# Patient Record
Sex: MALE | Race: WHITE | NOT HISPANIC OR LATINO | Employment: UNEMPLOYED | URBAN - METROPOLITAN AREA
[De-identification: names, ages, dates, MRNs, and addresses within clinical notes are randomized per-mention and may not be internally consistent; named-entity substitution may affect disease eponyms.]

---

## 2018-10-05 ENCOUNTER — HOSPITAL ENCOUNTER (OUTPATIENT)
Dept: FAMILY MEDICINE CLINIC | Facility: CLINIC | Age: 53
Setting detail: SPECIMEN
Discharge: HOME | End: 2018-10-05
Attending: FAMILY MEDICINE | Admitting: FAMILY MEDICINE

## 2018-10-05 LAB
ALBUMIN SERPL-MCNC: 4.3 G/DL (ref 3.5–4.8)
ALBUMIN/GLOB SERPL: 1.7 {RATIO} (ref 1–1.7)
ALP SERPL-CCNC: 56 IU/L (ref 32–91)
ALT SERPL-CCNC: 25 IU/L (ref 17–63)
ANION GAP SERPL CALC-SCNC: 13.9 MMOL/L (ref 10–20)
AST SERPL-CCNC: 21 IU/L (ref 15–41)
BASOPHILS # BLD AUTO: 0 10*3/UL (ref 0–0.2)
BASOPHILS NFR BLD AUTO: 1 % (ref 0–2)
BILIRUB SERPL-MCNC: 2.1 MG/DL (ref 0.3–1.2)
BUN SERPL-MCNC: 19 MG/DL (ref 8–20)
BUN/CREAT SERPL: 21.1 (ref 6.2–20.3)
CALCIUM SERPL-MCNC: 9.2 MG/DL (ref 8.9–10.3)
CHLORIDE SERPL-SCNC: 103 MMOL/L (ref 101–111)
CHOLEST SERPL-MCNC: 197 MG/DL
CHOLEST/HDLC SERPL: 2.6 {RATIO}
CONV CO2: 23 MMOL/L (ref 22–32)
CONV LDL CHOLESTEROL DIRECT: 112 MG/DL (ref 0–100)
CONV TOTAL PROTEIN: 6.9 G/DL (ref 6.1–7.9)
CREAT UR-MCNC: 0.9 MG/DL (ref 0.7–1.2)
DIFFERENTIAL METHOD BLD: (no result)
EOSINOPHIL # BLD AUTO: 0.1 10*3/UL (ref 0–0.3)
EOSINOPHIL # BLD AUTO: 1 % (ref 0–3)
ERYTHROCYTE [DISTWIDTH] IN BLOOD BY AUTOMATED COUNT: 13.1 % (ref 11.5–14.5)
GLOBULIN UR ELPH-MCNC: 2.6 G/DL (ref 2.5–3.8)
GLUCOSE SERPL-MCNC: 100 MG/DL (ref 65–99)
HCT VFR BLD AUTO: 44.6 % (ref 40–54)
HDLC SERPL-MCNC: 77 MG/DL
HGB BLD-MCNC: 15.7 G/DL (ref 14–18)
LDLC/HDLC SERPL: 1.5 {RATIO}
LIPID INTERPRETATION: ABNORMAL
LYMPHOCYTES # BLD AUTO: 1.4 10*3/UL (ref 0.8–4.8)
LYMPHOCYTES NFR BLD AUTO: 22 % (ref 18–42)
MCH RBC QN AUTO: 31.2 PG (ref 26–32)
MCHC RBC AUTO-ENTMCNC: 35.2 G/DL (ref 32–36)
MCV RBC AUTO: 88.6 FL (ref 80–94)
MONOCYTES # BLD AUTO: 0.8 10*3/UL (ref 0.1–1.3)
MONOCYTES NFR BLD AUTO: 13 % (ref 2–11)
NEUTROPHILS # BLD AUTO: 4.1 10*3/UL (ref 2.3–8.6)
NEUTROPHILS NFR BLD AUTO: 63 % (ref 50–75)
NRBC BLD AUTO-RTO: 0 /100{WBCS}
NRBC/RBC NFR BLD MANUAL: 0 10*3/UL
PLATELET # BLD AUTO: 192 10*3/UL (ref 150–450)
PMV BLD AUTO: 9.5 FL (ref 7.4–10.4)
POTASSIUM SERPL-SCNC: 3.9 MMOL/L (ref 3.6–5.1)
RBC # BLD AUTO: 5.03 10*6/UL (ref 4.6–6)
SODIUM SERPL-SCNC: 136 MMOL/L (ref 136–144)
TRIGL SERPL-MCNC: 45 MG/DL
VLDLC SERPL CALC-MCNC: 8.7 MG/DL
WBC # BLD AUTO: 6.4 10*3/UL (ref 4.5–11.5)

## 2019-05-30 ENCOUNTER — OFFICE (AMBULATORY)
Dept: URBAN - METROPOLITAN AREA PATHOLOGY 4 | Facility: PATHOLOGY | Age: 54
End: 2019-05-30
Payer: COMMERCIAL

## 2019-05-30 ENCOUNTER — ON CAMPUS - OUTPATIENT (AMBULATORY)
Dept: URBAN - METROPOLITAN AREA HOSPITAL 2 | Facility: HOSPITAL | Age: 54
End: 2019-05-30
Payer: COMMERCIAL

## 2019-05-30 VITALS
DIASTOLIC BLOOD PRESSURE: 74 MMHG | DIASTOLIC BLOOD PRESSURE: 75 MMHG | WEIGHT: 161 LBS | SYSTOLIC BLOOD PRESSURE: 140 MMHG | DIASTOLIC BLOOD PRESSURE: 86 MMHG | SYSTOLIC BLOOD PRESSURE: 121 MMHG | HEART RATE: 65 BPM | HEART RATE: 60 BPM | DIASTOLIC BLOOD PRESSURE: 85 MMHG | TEMPERATURE: 98.2 F | HEIGHT: 68 IN | SYSTOLIC BLOOD PRESSURE: 141 MMHG | DIASTOLIC BLOOD PRESSURE: 78 MMHG | OXYGEN SATURATION: 99 % | HEART RATE: 61 BPM | SYSTOLIC BLOOD PRESSURE: 142 MMHG | RESPIRATION RATE: 16 BRPM | OXYGEN SATURATION: 100 % | SYSTOLIC BLOOD PRESSURE: 131 MMHG | HEART RATE: 64 BPM

## 2019-05-30 DIAGNOSIS — K64.1 SECOND DEGREE HEMORRHOIDS: ICD-10-CM

## 2019-05-30 DIAGNOSIS — D12.2 BENIGN NEOPLASM OF ASCENDING COLON: ICD-10-CM

## 2019-05-30 DIAGNOSIS — D12.4 BENIGN NEOPLASM OF DESCENDING COLON: ICD-10-CM

## 2019-05-30 DIAGNOSIS — Z80.0 FAMILY HISTORY OF MALIGNANT NEOPLASM OF DIGESTIVE ORGANS: ICD-10-CM

## 2019-05-30 LAB
GI HISTOLOGY: A. UNSPECIFIED: (no result)
GI HISTOLOGY: B. UNSPECIFIED: (no result)
GI HISTOLOGY: PDF REPORT: (no result)

## 2019-05-30 PROCEDURE — 88305 TISSUE EXAM BY PATHOLOGIST: CPT | Mod: 33 | Performed by: INTERNAL MEDICINE

## 2019-05-30 PROCEDURE — 45380 COLONOSCOPY AND BIOPSY: CPT | Mod: 33 | Performed by: INTERNAL MEDICINE

## 2019-05-31 ENCOUNTER — HOSPITAL ENCOUNTER (OUTPATIENT)
Dept: CARDIOLOGY | Facility: HOSPITAL | Age: 54
Discharge: HOME | End: 2019-05-31
Attending: FAMILY MEDICINE | Admitting: FAMILY MEDICINE

## 2019-08-13 ENCOUNTER — TELEPHONE (OUTPATIENT)
Dept: FAMILY MEDICINE CLINIC | Facility: CLINIC | Age: 54
End: 2019-08-13

## 2019-08-16 ENCOUNTER — TELEPHONE (OUTPATIENT)
Dept: FAMILY MEDICINE CLINIC | Facility: CLINIC | Age: 54
End: 2019-08-16

## 2019-08-16 NOTE — TELEPHONE ENCOUNTER
Patient is wanting a referral to see [facility] for dermatology. Their fax number is [phone]

## 2019-09-30 ENCOUNTER — OFFICE VISIT (OUTPATIENT)
Dept: FAMILY MEDICINE CLINIC | Facility: CLINIC | Age: 54
End: 2019-09-30

## 2019-09-30 VITALS
HEIGHT: 68 IN | BODY MASS INDEX: 25.55 KG/M2 | OXYGEN SATURATION: 98 % | WEIGHT: 168.6 LBS | SYSTOLIC BLOOD PRESSURE: 153 MMHG | RESPIRATION RATE: 16 BRPM | HEART RATE: 98 BPM | DIASTOLIC BLOOD PRESSURE: 81 MMHG | TEMPERATURE: 98.2 F

## 2019-09-30 DIAGNOSIS — L57.0 ACTINIC KERATOSIS: ICD-10-CM

## 2019-09-30 DIAGNOSIS — M62.08 RECTUS DIASTASIS: Primary | ICD-10-CM

## 2019-09-30 PROBLEM — E80.4 GILBERT'S SYNDROME: Status: ACTIVE | Noted: 2018-11-01

## 2019-09-30 PROCEDURE — 99213 OFFICE O/P EST LOW 20 MIN: CPT | Performed by: FAMILY MEDICINE

## 2019-09-30 RX ORDER — FLUTICASONE PROPIONATE 50 MCG
2 SPRAY, SUSPENSION (ML) NASAL 2 TIMES DAILY
COMMUNITY
Start: 2012-08-24 | End: 2021-09-24

## 2019-09-30 RX ORDER — ASPIRIN 81 MG/1
1 TABLET ORAL DAILY
Refills: 3 | COMMUNITY
Start: 2019-07-21 | End: 2020-03-16

## 2019-09-30 RX ORDER — TRAZODONE HYDROCHLORIDE 100 MG/1
1 TABLET ORAL NIGHTLY
Refills: 3 | COMMUNITY
Start: 2019-07-21 | End: 2020-03-16

## 2019-09-30 RX ORDER — LORATADINE 10 MG/1
10 TABLET ORAL DAILY
Refills: 3 | COMMUNITY
Start: 2019-07-21 | End: 2020-03-16

## 2019-09-30 RX ORDER — ZOLPIDEM TARTRATE 10 MG/1
1 TABLET ORAL NIGHTLY
Refills: 1 | COMMUNITY
Start: 2019-09-13 | End: 2019-12-22

## 2019-09-30 RX ORDER — MONTELUKAST SODIUM 10 MG/1
10 TABLET ORAL DAILY
Refills: 3 | COMMUNITY
Start: 2019-07-21 | End: 2020-03-16

## 2019-09-30 NOTE — PROGRESS NOTES
"Subjective   uEgene Roman is a 53 y.o. male.     Chief Complaint   Patient presents with   • Hernia       HPI  Chief Complaint: Abdominal hernia, actinic keratosis    Patient is a 53-year-old white male states that he is noticed a bulge in the midline of the abdominal area.  This is above his bellybutton.  He states that several centimeters lump he states it only occurs when he does sit ups.  Denied pain.  Denies injury.    Patient also notices a skin lesion in the left facial area.  He states it is red.  He states that it does not go away.  Again he denies injury.  Patient is outside a lot and he had significant sun exposure.      The following portions of the patient's history were reviewed and updated as appropriate: allergies, current medications, past family history, past medical history, past social history, past surgical history and problem list.    Review of Systems    Objective     /81 (BP Location: Right arm, Patient Position: Sitting, Cuff Size: Adult)   Pulse 98   Temp 98.2 °F (36.8 °C) (Oral)   Resp 16   Ht 172.7 cm (68\")   Wt 76.5 kg (168 lb 9.6 oz)   SpO2 98%   BMI 25.64 kg/m²     Physical Exam   Constitutional: He is oriented to person, place, and time. He appears well-developed and well-nourished.   HENT:   Head: Normocephalic and atraumatic.   Eyes: Conjunctivae and EOM are normal. Pupils are equal, round, and reactive to light.   Neck: Normal range of motion. Neck supple.   Cardiovascular: Normal rate, regular rhythm, normal heart sounds and intact distal pulses.   Pulmonary/Chest: Effort normal and breath sounds normal.   Abdominal: Soft. Bowel sounds are normal.   Diastasis rectus     Musculoskeletal: Normal range of motion.   Neurological: He is alert and oriented to person, place, and time.   Skin: Skin is warm and dry.   Actinic keratoses and left cheek   Psychiatric: He has a normal mood and affect. His behavior is normal.   Nursing note and vitals reviewed.    He was advised " that he has diastases rectus.  Patient was advised that surgical repair is not necessary unless he desires it for cosmetic reasons more than the other.    Assessment/Plan   Eugene was seen today for hernia.    Diagnoses and all orders for this visit:    Rectus diastasis    Actinic keratosis  -     Ambulatory Referral to Dermatology      Patient Instructions   Continue your current medications and treatment.    Consider seeing a surgeon to discuss repair.    Follow up at your next appointment.          Black Mesa Jr., MD    09/30/19

## 2019-09-30 NOTE — PATIENT INSTRUCTIONS
Continue your current medications and treatment.    Consider seeing a surgeon to discuss repair.    Follow-up with rheumatology.    Follow up at your next appointment.

## 2019-12-22 DIAGNOSIS — F51.01 PRIMARY INSOMNIA: Primary | ICD-10-CM

## 2019-12-22 RX ORDER — ZOLPIDEM TARTRATE 10 MG/1
TABLET ORAL
Qty: 90 TABLET | Refills: 0 | Status: SHIPPED | OUTPATIENT
Start: 2019-12-22 | End: 2020-03-16

## 2020-03-16 DIAGNOSIS — F51.01 PRIMARY INSOMNIA: ICD-10-CM

## 2020-03-16 RX ORDER — TRAZODONE HYDROCHLORIDE 100 MG/1
TABLET ORAL
Qty: 90 TABLET | Refills: 0 | Status: SHIPPED | OUTPATIENT
Start: 2020-03-16 | End: 2020-05-22

## 2020-03-16 RX ORDER — ASPIRIN 81 MG/1
TABLET ORAL
Qty: 90 TABLET | Refills: 0 | Status: SHIPPED | OUTPATIENT
Start: 2020-03-16 | End: 2020-05-22

## 2020-03-16 RX ORDER — LORATADINE 10 MG/1
TABLET ORAL
Qty: 90 TABLET | Refills: 0 | Status: SHIPPED | OUTPATIENT
Start: 2020-03-16 | End: 2020-05-22

## 2020-03-16 RX ORDER — ZOLPIDEM TARTRATE 10 MG/1
TABLET ORAL
Qty: 90 TABLET | Refills: 0 | Status: SHIPPED | OUTPATIENT
Start: 2020-03-16 | End: 2020-06-02 | Stop reason: SDUPTHER

## 2020-03-16 RX ORDER — MONTELUKAST SODIUM 10 MG/1
TABLET ORAL
Qty: 90 TABLET | Refills: 0 | Status: SHIPPED | OUTPATIENT
Start: 2020-03-16 | End: 2020-05-22

## 2020-05-22 RX ORDER — MONTELUKAST SODIUM 10 MG/1
TABLET ORAL
Qty: 90 TABLET | Refills: 0 | Status: SHIPPED | OUTPATIENT
Start: 2020-05-22 | End: 2020-06-02 | Stop reason: SDUPTHER

## 2020-05-22 RX ORDER — ASPIRIN 81 MG/1
TABLET, COATED ORAL
Qty: 90 TABLET | Refills: 0 | Status: SHIPPED | OUTPATIENT
Start: 2020-05-22 | End: 2020-06-02 | Stop reason: SDUPTHER

## 2020-05-22 RX ORDER — LORATADINE 10 MG/1
TABLET ORAL
Qty: 90 TABLET | Refills: 0 | Status: SHIPPED | OUTPATIENT
Start: 2020-05-22 | End: 2020-06-02 | Stop reason: SDUPTHER

## 2020-05-22 RX ORDER — TRAZODONE HYDROCHLORIDE 100 MG/1
TABLET ORAL
Qty: 90 TABLET | Refills: 0 | Status: SHIPPED | OUTPATIENT
Start: 2020-05-22 | End: 2020-06-02 | Stop reason: SDUPTHER

## 2020-06-02 ENCOUNTER — TELEMEDICINE (OUTPATIENT)
Dept: FAMILY MEDICINE CLINIC | Facility: CLINIC | Age: 55
End: 2020-06-02

## 2020-06-02 VITALS — DIASTOLIC BLOOD PRESSURE: 84 MMHG | HEART RATE: 62 BPM | SYSTOLIC BLOOD PRESSURE: 128 MMHG

## 2020-06-02 DIAGNOSIS — J30.9 ALLERGIC RHINITIS, UNSPECIFIED SEASONALITY, UNSPECIFIED TRIGGER: ICD-10-CM

## 2020-06-02 DIAGNOSIS — I38 VALVULAR HEART DISEASE: ICD-10-CM

## 2020-06-02 DIAGNOSIS — E80.4 GILBERT'S SYNDROME: ICD-10-CM

## 2020-06-02 DIAGNOSIS — F51.01 PRIMARY INSOMNIA: ICD-10-CM

## 2020-06-02 DIAGNOSIS — E78.5 DYSLIPIDEMIA: Primary | ICD-10-CM

## 2020-06-02 DIAGNOSIS — Z95.2 PRESENCE OF PROSTHETIC HEART VALVE: ICD-10-CM

## 2020-06-02 PROCEDURE — 99214 OFFICE O/P EST MOD 30 MIN: CPT | Performed by: FAMILY MEDICINE

## 2020-06-02 RX ORDER — ASPIRIN 81 MG/1
81 TABLET ORAL DAILY
Qty: 90 TABLET | Refills: 3 | Status: SHIPPED | OUTPATIENT
Start: 2020-06-02 | End: 2020-06-08

## 2020-06-02 RX ORDER — LORATADINE 10 MG/1
10 TABLET ORAL DAILY
Qty: 90 TABLET | Refills: 3 | Status: SHIPPED | OUTPATIENT
Start: 2020-06-02 | End: 2020-08-22

## 2020-06-02 RX ORDER — TRAZODONE HYDROCHLORIDE 100 MG/1
100 TABLET ORAL
Qty: 90 TABLET | Refills: 3 | Status: SHIPPED | OUTPATIENT
Start: 2020-06-02 | End: 2020-06-08

## 2020-06-02 RX ORDER — MONTELUKAST SODIUM 10 MG/1
10 TABLET ORAL DAILY
Qty: 90 TABLET | Refills: 3 | Status: SHIPPED | OUTPATIENT
Start: 2020-06-02 | End: 2020-06-08

## 2020-06-02 RX ORDER — ZOLPIDEM TARTRATE 10 MG/1
10 TABLET ORAL
Qty: 90 TABLET | Refills: 1 | Status: SHIPPED | OUTPATIENT
Start: 2020-06-02 | End: 2021-01-21 | Stop reason: SDUPTHER

## 2020-06-02 NOTE — PROGRESS NOTES
Subjective   Eugene Roman is a 54 y.o. male.     Chief Complaint   Patient presents with   • Insomnia       HPI  Chief complaint: Insomnia allergic rhinitis heart disease    The patient is a 54-year-old white male who was seen by video visit today for follow-up and maintenance of his current problems.  Patient gave permission to be seen by video visit.    The patient's current problems include    1.  Primary insomnia-stable-patient on trazodone 50 mg at night and Ambien 10 mg at night.  He states this helps with his sleep and anxiousness.    2.  Allergic rhinitis-stable-patient is on Claritin 10 mg daily and Singulair 10 mg daily.  He denied sinus congestion drainage cough shortness of breath or wheezing.    3.  Valvular heart disease-the patient's had aortic valve replacement and pulmonic valve repair with tissue valves.  Patient denies chest pain shortness of breath orthopnea or PND.    4.  Hyperlipidemia-stable-patient is currently on no medications.  He is treating this with lifestyle modification.          The following portions of the patient's history were reviewed and updated as appropriate: allergies, current medications, past family history, past medical history, past social history, past surgical history and problem list.    Review of Systems   Constitutional: Negative for chills and fever.   HENT: Negative for congestion, sinus pressure and swollen glands.    Eyes: Negative for blurred vision and pain.   Respiratory: Negative for cough and shortness of breath.    Cardiovascular: Negative for chest pain and leg swelling.   Gastrointestinal: Negative for abdominal pain, nausea and indigestion.   Endocrine: Negative for cold intolerance, heat intolerance, polydipsia, polyphagia and polyuria.   Skin: Negative for dry skin, rash and bruise.   Neurological: Negative for dizziness, syncope and headache.   Psychiatric/Behavioral: Negative for dysphoric mood and stress.       Objective     /84   Pulse 62      Physical Exam   Constitutional: He appears well-developed and well-nourished.   Pulmonary/Chest: Effort normal.   Musculoskeletal: Normal range of motion.   Vitals reviewed.         Assessment/Plan   Eugene was seen today for insomnia.    Diagnoses and all orders for this visit:    Dyslipidemia  -     Lipid Panel; Future    Presence of prosthetic heart valve  -     CBC & Differential; Future  -     Comprehensive Metabolic Panel; Future    Allergic rhinitis, unspecified seasonality, unspecified trigger    Gilbert's syndrome    Primary insomnia    Valvular heart disease    Other orders  -     loratadine (CLARITIN) 10 MG tablet; Take 1 tablet by mouth Daily.  -     aspirin (Aspirin Low Dose) 81 MG EC tablet; Take 1 tablet by mouth Daily.  -     traZODone (DESYREL) 100 MG tablet; Take 1 tablet by mouth every night at bedtime.  -     montelukast (SINGULAIR) 10 MG tablet; Take 1 tablet by mouth Daily.      Patient Instructions   Continue your current medications and treatment.    Have the follow up labs done and call for results.    Follow up in the office in 6 months.      Black Mesa Jr., MD    06/02/20

## 2020-06-08 RX ORDER — MONTELUKAST SODIUM 10 MG/1
TABLET ORAL
Qty: 90 TABLET | Refills: 3 | Status: SHIPPED | OUTPATIENT
Start: 2020-06-08 | End: 2021-08-19 | Stop reason: SDUPTHER

## 2020-06-08 RX ORDER — TRAZODONE HYDROCHLORIDE 100 MG/1
TABLET ORAL
Qty: 90 TABLET | Refills: 3 | Status: SHIPPED | OUTPATIENT
Start: 2020-06-08 | End: 2021-08-12

## 2020-06-08 RX ORDER — ASPIRIN 81 MG
TABLET, DELAYED RELEASE (ENTERIC COATED) ORAL
Qty: 90 TABLET | Refills: 3 | Status: SHIPPED | OUTPATIENT
Start: 2020-06-08

## 2020-08-22 RX ORDER — LORATADINE 10 MG/1
TABLET ORAL
Qty: 90 TABLET | Refills: 3 | Status: SHIPPED | OUTPATIENT
Start: 2020-08-22

## 2021-01-21 ENCOUNTER — TELEMEDICINE (OUTPATIENT)
Dept: FAMILY MEDICINE CLINIC | Facility: CLINIC | Age: 56
End: 2021-01-21

## 2021-01-21 VITALS — SYSTOLIC BLOOD PRESSURE: 126 MMHG | HEART RATE: 68 BPM | DIASTOLIC BLOOD PRESSURE: 77 MMHG

## 2021-01-21 DIAGNOSIS — E78.5 DYSLIPIDEMIA: Chronic | ICD-10-CM

## 2021-01-21 DIAGNOSIS — F51.01 PRIMARY INSOMNIA: Primary | Chronic | ICD-10-CM

## 2021-01-21 DIAGNOSIS — J30.9 ALLERGIC RHINITIS, UNSPECIFIED SEASONALITY, UNSPECIFIED TRIGGER: Chronic | ICD-10-CM

## 2021-01-21 PROBLEM — I38 VALVULAR HEART DISEASE: Chronic | Status: ACTIVE | Noted: 2020-06-02

## 2021-01-21 PROCEDURE — 99213 OFFICE O/P EST LOW 20 MIN: CPT | Performed by: FAMILY MEDICINE

## 2021-01-21 RX ORDER — TADALAFIL 10 MG/1
10 TABLET ORAL DAILY PRN
Qty: 30 TABLET | Refills: 2 | Status: SHIPPED | OUTPATIENT
Start: 2021-01-21 | End: 2021-08-19 | Stop reason: SDUPTHER

## 2021-01-21 RX ORDER — ZOLPIDEM TARTRATE 10 MG/1
10 TABLET ORAL
Qty: 90 TABLET | Refills: 1 | Status: SHIPPED | OUTPATIENT
Start: 2021-01-21 | End: 2021-07-08 | Stop reason: SDUPTHER

## 2021-01-21 NOTE — PROGRESS NOTES
Subjective   Eugene Roman is a 55 y.o. male.     Chief Complaint   Patient presents with   • Insomnia   • Allergic Rhinitis       HPI  Chief complaint: Insomnia, allergic rhinitis, valvular heart disease    Patient is a 55-year-old white male comes in for follow-up and maintenance of his current problems which include    1.  Insomnia-stable-the patient is current on Ambien 10 mg daily and trazodone 50 to 100 mg at night.  He states that this helps him to sleep.    2.  Allergic rhinitis-stable-patient is on Claritin 10 mg daily as needed Singulair 10 mg daily as needed and Flonase.  He states that he has problems in the spring and fall and when he is outside.  She does not take these regularly.    3.  Valvular heart disease-stable-patient status post aortic valve replacement and pulmonic valve repair.  Denies chest pain shortness of breath orthopnea or PND.        The following portions of the patient's history were reviewed and updated as appropriate: allergies, current medications, past family history, past medical history, past social history, past surgical history and problem list.    Review of Systems    Objective     /77   Pulse 68     Physical Exam   Constitutional: He appears well-developed and well-nourished.   Vitals reviewed.         Assessment/Plan   Diagnoses and all orders for this visit:    1. Primary insomnia (Primary)  -     zolpidem (AMBIEN) 10 MG tablet; Take 1 tablet by mouth every night at bedtime.  Dispense: 90 tablet; Refill: 1    2. Dyslipidemia  -     Lipid Panel; Future  -     Glucose, Plasma ([facility]); Future    3. Allergic rhinitis, unspecified seasonality, unspecified trigger    Other orders  -     tadalafil (Cialis) 10 MG tablet; Take 1 tablet by mouth Daily As Needed for Erectile Dysfunction.  Dispense: 30 tablet; Refill: 2      Patient Instructions   Continue your current healthy lifestyle.    Have the follow up labs done and call for results.    Continue current  medications.    Follow up in the office in 6 months.      Black Mesa Jr., MD    01/21/21

## 2021-07-08 ENCOUNTER — TELEPHONE (OUTPATIENT)
Dept: FAMILY MEDICINE CLINIC | Facility: CLINIC | Age: 56
End: 2021-07-08

## 2021-07-08 DIAGNOSIS — F51.01 PRIMARY INSOMNIA: Chronic | ICD-10-CM

## 2021-07-08 RX ORDER — ZOLPIDEM TARTRATE 10 MG/1
10 TABLET ORAL
Qty: 90 TABLET | Refills: 1 | Status: CANCELLED | OUTPATIENT
Start: 2021-07-08

## 2021-07-08 RX ORDER — ZOLPIDEM TARTRATE 10 MG/1
10 TABLET ORAL
Qty: 90 TABLET | Refills: 0 | Status: SHIPPED | OUTPATIENT
Start: 2021-07-08 | End: 2021-08-19 | Stop reason: SDUPTHER

## 2021-07-08 RX ORDER — ZOLPIDEM TARTRATE 10 MG/1
10 TABLET ORAL
Qty: 90 TABLET | Refills: 0 | Status: SHIPPED | OUTPATIENT
Start: 2021-07-08 | End: 2021-07-08 | Stop reason: SDUPTHER

## 2021-07-08 NOTE — TELEPHONE ENCOUNTER
Caller: Eugene Roman    Relationship: Self    Best call back number: [phone]     Medication needed:   Requested Prescriptions     Pending Prescriptions Disp Refills   • zolpidem (AMBIEN) 10 MG tablet 90 tablet 1     Sig: Take 1 tablet by mouth every night at bedtime.       When do you need the refill by: 07/12/21    What additional details did the patient provide when requesting the medication: HAS AN APPT ON 08/19/21    Does the patient have less than a 3 day supply:  [] Yes  [x] No    What is the patient's preferred pharmacy: [facility] [address] [facility] AT [facility] [phone] [facility] [phone]

## 2021-07-08 NOTE — TELEPHONE ENCOUNTER
PATIENT REQUESTS THE MEDICATION:  zolpidem (AMBIEN) 10 MG tablet [[id number]]     BE SENT TO:  [facility] [address] [place] [address] [address] AT [facility] - [phone]  - [phone] FX  P: [phone]  F: [phone]        PATIENT CALL BACK #: [phone]

## 2021-07-28 ENCOUNTER — LAB (OUTPATIENT)
Dept: LAB | Facility: HOSPITAL | Age: 56
End: 2021-07-28

## 2021-07-28 DIAGNOSIS — E78.5 DYSLIPIDEMIA: Chronic | ICD-10-CM

## 2021-07-28 DIAGNOSIS — E78.5 HYPERLIPIDEMIA, UNSPECIFIED HYPERLIPIDEMIA TYPE: Primary | ICD-10-CM

## 2021-07-28 LAB
CHOLEST SERPL-MCNC: 189 MG/DL (ref 0–200)
GLUCOSE SERPL-MCNC: 93 MG/DL (ref 65–99)
HDLC SERPL-MCNC: 59 MG/DL (ref 40–60)
LDLC SERPL CALC-MCNC: 114 MG/DL (ref 0–100)
LDLC/HDLC SERPL: 1.9 {RATIO}
TRIGL SERPL-MCNC: 90 MG/DL (ref 0–150)
VLDLC SERPL-MCNC: 16 MG/DL (ref 5–40)

## 2021-07-28 PROCEDURE — 80061 LIPID PANEL: CPT

## 2021-07-28 PROCEDURE — 82947 ASSAY GLUCOSE BLOOD QUANT: CPT

## 2021-07-28 PROCEDURE — 36415 COLL VENOUS BLD VENIPUNCTURE: CPT

## 2021-08-12 RX ORDER — TRAZODONE HYDROCHLORIDE 100 MG/1
TABLET ORAL
Qty: 90 TABLET | Refills: 1 | Status: SHIPPED | OUTPATIENT
Start: 2021-08-12 | End: 2021-08-19 | Stop reason: SDUPTHER

## 2021-08-19 ENCOUNTER — OFFICE VISIT (OUTPATIENT)
Dept: FAMILY MEDICINE CLINIC | Facility: CLINIC | Age: 56
End: 2021-08-19

## 2021-08-19 VITALS
HEIGHT: 68 IN | TEMPERATURE: 97.1 F | SYSTOLIC BLOOD PRESSURE: 125 MMHG | HEART RATE: 75 BPM | OXYGEN SATURATION: 98 % | BODY MASS INDEX: 25.64 KG/M2 | WEIGHT: 169.2 LBS | RESPIRATION RATE: 16 BRPM | DIASTOLIC BLOOD PRESSURE: 73 MMHG

## 2021-08-19 DIAGNOSIS — F51.01 PRIMARY INSOMNIA: Chronic | ICD-10-CM

## 2021-08-19 DIAGNOSIS — H93.90 EAR LESION: Primary | ICD-10-CM

## 2021-08-19 DIAGNOSIS — D17.21 LIPOMA OF RIGHT UPPER EXTREMITY: ICD-10-CM

## 2021-08-19 DIAGNOSIS — M17.11 ARTHRITIS OF KNEE, RIGHT: ICD-10-CM

## 2021-08-19 DIAGNOSIS — I38 VALVULAR HEART DISEASE: Chronic | ICD-10-CM

## 2021-08-19 PROCEDURE — 99214 OFFICE O/P EST MOD 30 MIN: CPT | Performed by: FAMILY MEDICINE

## 2021-08-19 RX ORDER — NEOMYCIN AND POLYMYXIN B SULFATES, BACITRACIN ZINC, AND HYDROCORTISONE 3.5; 5000; 400; 1 MG/G; [IU]/G; [IU]/G; MG/G
OINTMENT TOPICAL 2 TIMES DAILY
Qty: 15 G | Refills: 1 | Status: SHIPPED | OUTPATIENT
Start: 2021-08-19 | End: 2023-02-08

## 2021-08-19 RX ORDER — ZOLPIDEM TARTRATE 10 MG/1
10 TABLET ORAL
Qty: 90 TABLET | Refills: 0 | Status: SHIPPED | OUTPATIENT
Start: 2021-08-19 | End: 2021-10-13

## 2021-08-19 RX ORDER — TRAZODONE HYDROCHLORIDE 100 MG/1
100 TABLET ORAL NIGHTLY
Qty: 90 TABLET | Refills: 3 | Status: SHIPPED | OUTPATIENT
Start: 2021-08-19 | End: 2022-07-11

## 2021-08-19 RX ORDER — MONTELUKAST SODIUM 10 MG/1
10 TABLET ORAL
Qty: 90 TABLET | Refills: 3 | Status: SHIPPED | OUTPATIENT
Start: 2021-08-19 | End: 2021-10-01

## 2021-08-19 RX ORDER — TADALAFIL 10 MG/1
10 TABLET ORAL DAILY PRN
Qty: 30 TABLET | Refills: 2 | Status: SHIPPED | OUTPATIENT
Start: 2021-08-19 | End: 2021-10-13

## 2021-08-19 NOTE — PROGRESS NOTES
Subjective   Eugene Roman is a 55 y.o. male.     Chief Complaint   Patient presents with   • Ear Problem     right ear   • Knee Problem     range of motion   • Mass     right arm       HPI  Chief complaint: Ear pain right arm mass knee pain insomnia    Patient is a 55-year-old white male comes in for follow-up and maintenance of his current problems which include    1.  Ear pain-new-patient pain in the right ear canal.  Has had trouble with it for the past several months.  Patient does have to use earplugs at night to sleep.  He states that he feels like there is something in the ear.  He denies hearing problems.    2.  Right arm mass-deteriorated-patient states he has had a lipoma in the posterior aspect of his right upper arm.  He states it has been present for the past several months.  For several years.  He states that sometimes he gets sore.  He wonders whether it should be removed.    3.  Knee pain-new-patient states that he has pain in his right knee whenever he tries to run or Hyperflex the knee.  The patient is a triathlete.  He states that he is not been able to run for the past several months.  He states the knee does not swell up on him.    4.  Valvular heart disease-stable-patient's had aortic valve replacement and repair of the pulmonic valve.  He is on aspirin 81 mg daily.  Denied chest pain shortness of breath orthopnea or PND.    5.  Insomnia-stable-patient is currently on Ambien 10 mg at night of sleep and trazodone.  He states that this helps him to sleep but he still has difficulty sleeping.        The following portions of the patient's history were reviewed and updated as appropriate: allergies, current medications, past family history, past medical history, past social history, past surgical history and problem list.    Review of Systems    Objective     /73 (BP Location: Right arm, Patient Position: Sitting, Cuff Size: Adult)   Pulse 75   Temp 97.1 °F (36.2 °C) (Infrared)   Resp 16  "  Ht 172.7 cm (68\")   Wt 76.7 kg (169 lb 3.2 oz)   SpO2 98%   BMI 25.73 kg/m²     Physical Exam  Vitals and nursing note reviewed.   Constitutional:       Appearance: He is well-developed.   HENT:      Head: Normocephalic and atraumatic.      Ears:      Comments: Patient has a 2 mm ulcer in the aspect of the right external ear canal just at the entry into the canal.  Eyes:      Pupils: Pupils are equal, round, and reactive to light.   Cardiovascular:      Rate and Rhythm: Normal rate and regular rhythm.      Heart sounds: Murmur heard.   Crescendo systolic murmur is present with a grade of 2/6.     Pulmonary:      Effort: Pulmonary effort is normal.      Breath sounds: Normal breath sounds.   Abdominal:      General: Bowel sounds are normal.      Palpations: Abdomen is soft.   Musculoskeletal:         General: Normal range of motion.      Cervical back: Neck supple.   Skin:     General: Skin is warm and dry.   Neurological:      Mental Status: He is oriented to person, place, and time.   Psychiatric:         Behavior: Behavior normal.         Thought Content: Thought content normal.         Judgment: Judgment normal.           Assessment/Plan   Diagnoses and all orders for this visit:    1. Ear lesion (Primary)-the patient was advised that he has a 2 minimal or ulcer in the anterior aspect of the external ear canal.  He was advised this could be a skin cancer actinic keratosis can use Cortisporin ointment.  I recommended follow-up with dermatology for treatment.    2. Arthritis of knee, right-patient has arthritis involving the patellofemoral component of the knee.  I recommended nonimpact exercise.    3. Primary insomnia  -     zolpidem (AMBIEN) 10 MG tablet; Take 1 tablet by mouth every night at bedtime.  Dispense: 90 tablet; Refill: 0    4. Valvular heart disease    5. Lipoma of right upper extremity-the patient has a 1.5 cm freely mobile lipoma in the posterior aspect of the upper arm on the right.  I " recommended patient have surgical evaluation to have this removed since it is bothering him.    Other orders  -     tadalafil (Cialis) 10 MG tablet; Take 1 tablet by mouth Daily As Needed for Erectile Dysfunction.  Dispense: 30 tablet; Refill: 2  -     montelukast (SINGULAIR) 10 MG tablet; Take 1 tablet by mouth every night at bedtime.  Dispense: 90 tablet; Refill: 3  -     traZODone (DESYREL) 100 MG tablet; Take 1 tablet by mouth Every Night.  Dispense: 90 tablet; Refill: 3  -     bacitracin-neomycin-polymyxin b-hydrocortisone (Cortisporin) 1 % ointment; Apply  topically to the appropriate area as directed 2 (Two) Times a Day.  Dispense: 15 g; Refill: 1      Patient Instructions   Continue your current medications and treatment.    See the dermatologist and use the ointment.    Consider having the lipoma removed.          Black Mesa Jr., MD    08/19/21

## 2021-09-24 RX ORDER — FLUTICASONE PROPIONATE 50 MCG
SPRAY, SUSPENSION (ML) NASAL
Qty: 48 ML | Refills: 0 | Status: SHIPPED | OUTPATIENT
Start: 2021-09-24 | End: 2022-01-27

## 2021-10-01 RX ORDER — MONTELUKAST SODIUM 10 MG/1
TABLET ORAL
Qty: 100 TABLET | Refills: 0 | Status: SHIPPED | OUTPATIENT
Start: 2021-10-01 | End: 2022-05-09

## 2021-10-13 DIAGNOSIS — F51.01 PRIMARY INSOMNIA: Chronic | ICD-10-CM

## 2021-10-13 RX ORDER — ZOLPIDEM TARTRATE 10 MG/1
TABLET ORAL
Qty: 90 TABLET | Refills: 0 | Status: SHIPPED | OUTPATIENT
Start: 2021-10-13 | End: 2022-04-15

## 2021-10-13 RX ORDER — TADALAFIL 10 MG/1
TABLET ORAL
Qty: 30 TABLET | Refills: 2 | Status: SHIPPED | OUTPATIENT
Start: 2021-10-13 | End: 2023-01-10

## 2021-11-17 ENCOUNTER — IMMUNIZATION (OUTPATIENT)
Dept: VACCINE CLINIC | Facility: HOSPITAL | Age: 56
End: 2021-11-17

## 2021-11-17 PROCEDURE — 0004A ADM SARSCOV2 30MCG/0.3ML BOOSTER: CPT | Performed by: INTERNAL MEDICINE

## 2021-11-17 PROCEDURE — 91300 HC SARSCOV02 VAC 30MCG/0.3ML IM: CPT | Performed by: INTERNAL MEDICINE

## 2022-01-01 ENCOUNTER — TELEPHONE (OUTPATIENT)
Dept: FAMILY MEDICINE CLINIC | Facility: CLINIC | Age: 57
End: 2022-01-01

## 2022-01-27 RX ORDER — FLUTICASONE PROPIONATE 50 MCG
SPRAY, SUSPENSION (ML) NASAL
Qty: 48 ML | Refills: 0 | Status: SHIPPED | OUTPATIENT
Start: 2022-01-27 | End: 2022-08-12

## 2022-02-22 ENCOUNTER — TELEMEDICINE (OUTPATIENT)
Dept: FAMILY MEDICINE CLINIC | Facility: CLINIC | Age: 57
End: 2022-02-22

## 2022-02-22 DIAGNOSIS — J30.9 ALLERGIC RHINITIS, UNSPECIFIED SEASONALITY, UNSPECIFIED TRIGGER: Chronic | ICD-10-CM

## 2022-02-22 DIAGNOSIS — I38 VALVULAR HEART DISEASE: Chronic | ICD-10-CM

## 2022-02-22 DIAGNOSIS — E78.5 DYSLIPIDEMIA: Chronic | ICD-10-CM

## 2022-02-22 DIAGNOSIS — M11.20 PSEUDOGOUT: ICD-10-CM

## 2022-02-22 DIAGNOSIS — F51.01 PRIMARY INSOMNIA: Primary | Chronic | ICD-10-CM

## 2022-02-22 PROBLEM — D17.21 LIPOMA OF RIGHT UPPER EXTREMITY: Status: RESOLVED | Noted: 2021-08-19 | Resolved: 2022-02-22

## 2022-02-22 PROCEDURE — 99214 OFFICE O/P EST MOD 30 MIN: CPT | Performed by: FAMILY MEDICINE

## 2022-02-22 NOTE — PROGRESS NOTES
Subjective   Eugene Roman is a 56 y.o. male.     Chief Complaint   Patient presents with   • Insomnia   • Hyperlipidemia       HPI  Chief complaint: Hyperlipidemia insomnia allergic rhinitis valvular heart disease     The patient was seen by video visit for follow-up and maintenance of his current problems.  He gave permission to be seen by video visit.    The patient's current problems include    1.  Insomnia-stable-patient currently Ambien 10 mg at night and trazodone 100 mg at night.  He states this helps to control his sleeplessness.    2.  Allergic rhinitis-stable-patient on Singulair 10 mg daily Flonase 2 sprays each nostril once a day and Claritin 10 mg daily.  With this he is stable.    3.  Valvular heart disease/status post aortic valve replacement and pulmonic valve repair-stable-patient on aspirin 81 mg daily.  Denies chest pain shortness of breath orthopnea or PND.      4.  Hyperlipidemia-stable-patient is currently on a diet.  He has a 4.4% 10-year cardiovascular risk.    5.  Pseudogout-new-patient states he has been diagnosed with pseudogout by his orthopedic surgeon.  Primarily involves his knees.  He uses anti-inflammatories on an as-needed basis.        The following portions of the patient's history were reviewed and updated as appropriate: allergies, current medications, past family history, past medical history, past social history, past surgical history and problem list.    Review of Systems    Objective     There were no vitals taken for this visit.    Physical Exam   Constitutional: He appears well-developed and well-nourished.   Pulmonary/Chest: Effort normal.   Neurological: He is alert.   Skin: Skin is warm and dry.   Vitals reviewed.         Assessment/Plan   Diagnoses and all orders for this visit:    1. Primary insomnia (Primary)    2. Dyslipidemia    3. Allergic rhinitis, unspecified seasonality, unspecified trigger    4. Valvular heart disease    5. Pseudogout      Patient  Instructions   Continue your current medications and treatment.    Follow up in the office in 6 months.    Laboratory testing at that time.      Black Mesa Jr., MD    02/22/22

## 2022-04-14 DIAGNOSIS — F51.01 PRIMARY INSOMNIA: Chronic | ICD-10-CM

## 2022-04-15 RX ORDER — ZOLPIDEM TARTRATE 10 MG/1
TABLET ORAL
Qty: 90 TABLET | Refills: 0 | Status: SHIPPED | OUTPATIENT
Start: 2022-04-15 | End: 2022-07-12

## 2022-05-09 RX ORDER — MONTELUKAST SODIUM 10 MG/1
TABLET ORAL
Qty: 90 TABLET | Refills: 0 | Status: SHIPPED | OUTPATIENT
Start: 2022-05-09 | End: 2022-12-29

## 2022-07-11 DIAGNOSIS — F51.01 PRIMARY INSOMNIA: Chronic | ICD-10-CM

## 2022-07-11 RX ORDER — TRAZODONE HYDROCHLORIDE 100 MG/1
TABLET ORAL
Qty: 90 TABLET | Refills: 2 | Status: SHIPPED | OUTPATIENT
Start: 2022-07-11 | End: 2022-12-29

## 2022-07-12 RX ORDER — ZOLPIDEM TARTRATE 10 MG/1
TABLET ORAL
Qty: 90 TABLET | Refills: 0 | Status: SHIPPED | OUTPATIENT
Start: 2022-07-12 | End: 2022-10-07

## 2022-07-21 ENCOUNTER — OFFICE VISIT (OUTPATIENT)
Dept: FAMILY MEDICINE CLINIC | Facility: CLINIC | Age: 57
End: 2022-07-21

## 2022-07-21 VITALS
HEIGHT: 68 IN | SYSTOLIC BLOOD PRESSURE: 127 MMHG | RESPIRATION RATE: 16 BRPM | BODY MASS INDEX: 25.07 KG/M2 | DIASTOLIC BLOOD PRESSURE: 88 MMHG | WEIGHT: 165.4 LBS | OXYGEN SATURATION: 73 % | HEART RATE: 98 BPM | TEMPERATURE: 96.8 F

## 2022-07-21 DIAGNOSIS — Z13.9 ENCOUNTER FOR HEALTH-RELATED SCREENING: ICD-10-CM

## 2022-07-21 DIAGNOSIS — I38 VALVULAR HEART DISEASE: Chronic | ICD-10-CM

## 2022-07-21 DIAGNOSIS — M40.14 OTHER SECONDARY KYPHOSIS, THORACIC REGION: ICD-10-CM

## 2022-07-21 DIAGNOSIS — F51.01 PRIMARY INSOMNIA: Primary | Chronic | ICD-10-CM

## 2022-07-21 DIAGNOSIS — E78.5 DYSLIPIDEMIA: Chronic | ICD-10-CM

## 2022-07-21 PROCEDURE — 99214 OFFICE O/P EST MOD 30 MIN: CPT | Performed by: FAMILY MEDICINE

## 2022-07-21 NOTE — PROGRESS NOTES
"Subjective   Eugene Roman is a 56 y.o. male.     Chief Complaint   Patient presents with   • Hyperlipidemia     Follow up    • Pulmonary stenosis   • Gilbert's Syndrome   • Insomnia       HPI  Chief complaint: Allergic rhinitis, insomnia valvular heart disease kyphosis    Patient is a 56-year-old white male who comes in for follow-up and maintenance of his current problems which include    1.  Allergic rhinitis-stable-patient on Singulair 10 mg daily antihistamines as needed and Flonase 2 sprays each nostril once a day.  The patient states that symptoms are controlled.    2.  Insomnia-stable Adapin is on Ambien 10 mg daily and trazodone 50 mg at night.  He states it helps him to be able to sleep at night.    3.  Valvular heart disease/status post aortic valve replacement and pulmonic valve repair.  Patient denies chest pain shortness of breath orthopnea or PND.  Patient is on aspirin.    4.  Kyphosis-new-patient states that kyphosis runs in his family.  He states that his mother had it was severe his father's had a he wants to know what could be done for it.    5. hyperlipidemia-stable after patient on a diet.        The following portions of the patient's history were reviewed and updated as appropriate: allergies, current medications, past family history, past medical history, past social history, past surgical history and problem list.    Review of Systems    Objective     /88 (BP Location: Left arm, Patient Position: Sitting, Cuff Size: Adult)   Pulse 98   Temp 96.8 °F (36 °C) (Infrared)   Resp 16   Ht 172.7 cm (68\")   Wt 75 kg (165 lb 6.4 oz)   SpO2 (!) 73%   BMI 25.15 kg/m²     Physical Exam  Vitals and nursing note reviewed.   Constitutional:       Appearance: He is well-developed.   HENT:      Head: Normocephalic and atraumatic.   Eyes:      Pupils: Pupils are equal, round, and reactive to light.   Cardiovascular:      Rate and Rhythm: Normal rate and regular rhythm.      Pulses: Normal " pulses.      Heart sounds: Murmur heard.     No friction rub. No gallop.   Pulmonary:      Effort: Pulmonary effort is normal.      Breath sounds: Normal breath sounds.   Abdominal:      General: Bowel sounds are normal.      Palpations: Abdomen is soft.   Musculoskeletal:         General: Normal range of motion.      Cervical back: Neck supple.      Comments: Kyphosis     Skin:     General: Skin is warm and dry.   Neurological:      Mental Status: He is alert and oriented to person, place, and time.   Psychiatric:         Behavior: Behavior normal.         Thought Content: Thought content normal.         Judgment: Judgment normal.           Assessment & Plan   Diagnoses and all orders for this visit:    1. Primary insomnia (Primary)    2. Valvular heart disease    3. Dyslipidemia    4. Other secondary kyphosis, thoracic region-we discussed evaluation and treatment options.  I recommended an x-ray of the thoracic spine and a bone density.  Patient wanted to wait    5. Encounter for health-related screening  -     Lipid Panel; Future  -     Glucose, Plasma ([facility]); Future      Patient Instructions   Continue your current medications and treatment.    Follow up in the office in 6 months.    Have the follow up labs done and call for results.      Black Mesa Jr., MD    07/21/22

## 2022-08-12 RX ORDER — FLUTICASONE PROPIONATE 50 MCG
SPRAY, SUSPENSION (ML) NASAL
Qty: 48 ML | Refills: 0 | Status: SHIPPED | OUTPATIENT
Start: 2022-08-12 | End: 2022-12-29

## 2022-10-06 DIAGNOSIS — F51.01 PRIMARY INSOMNIA: Chronic | ICD-10-CM

## 2022-10-07 RX ORDER — ZOLPIDEM TARTRATE 10 MG/1
TABLET ORAL
Qty: 90 TABLET | Refills: 0 | Status: SHIPPED | OUTPATIENT
Start: 2022-10-07 | End: 2023-01-10

## 2022-11-04 ENCOUNTER — HOSPITAL ENCOUNTER (EMERGENCY)
Facility: HOSPITAL | Age: 57
Discharge: HOME | End: 2022-11-04
Attending: EMERGENCY MEDICINE | Admitting: EMERGENCY MEDICINE

## 2022-11-04 ENCOUNTER — OFFICE VISIT (OUTPATIENT)
Dept: FAMILY MEDICINE CLINIC | Facility: CLINIC | Age: 57
End: 2022-11-04

## 2022-11-04 VITALS
WEIGHT: 161 LBS | DIASTOLIC BLOOD PRESSURE: 81 MMHG | HEART RATE: 63 BPM | TEMPERATURE: 97 F | BODY MASS INDEX: 24.4 KG/M2 | HEIGHT: 68 IN | SYSTOLIC BLOOD PRESSURE: 131 MMHG | OXYGEN SATURATION: 99 %

## 2022-11-04 VITALS
OXYGEN SATURATION: 99 % | HEART RATE: 67 BPM | WEIGHT: 159.83 LBS | BODY MASS INDEX: 24.22 KG/M2 | DIASTOLIC BLOOD PRESSURE: 73 MMHG | SYSTOLIC BLOOD PRESSURE: 143 MMHG | RESPIRATION RATE: 18 BRPM | TEMPERATURE: 98 F | HEIGHT: 68 IN

## 2022-11-04 DIAGNOSIS — R42 VERTIGO: Primary | ICD-10-CM

## 2022-11-04 DIAGNOSIS — H81.13 BENIGN PAROXYSMAL POSITIONAL VERTIGO DUE TO BILATERAL VESTIBULAR DISORDER: Primary | ICD-10-CM

## 2022-11-04 LAB
ALBUMIN SERPL-MCNC: 4.9 G/DL (ref 3.5–5.2)
ALBUMIN/GLOB SERPL: 2 G/DL
ALP SERPL-CCNC: 64 U/L (ref 39–117)
ALT SERPL W P-5'-P-CCNC: 18 U/L (ref 1–41)
ANION GAP SERPL CALCULATED.3IONS-SCNC: 12 MMOL/L (ref 5–15)
AST SERPL-CCNC: 17 U/L (ref 1–40)
BASOPHILS # BLD AUTO: 0 10*3/MM3 (ref 0–0.2)
BASOPHILS NFR BLD AUTO: 0.2 % (ref 0–1.5)
BILIRUB SERPL-MCNC: 1.7 MG/DL (ref 0–1.2)
BUN SERPL-MCNC: 18 MG/DL (ref 6–20)
BUN/CREAT SERPL: 19.8 (ref 7–25)
CALCIUM SPEC-SCNC: 10.1 MG/DL (ref 8.6–10.5)
CHLORIDE SERPL-SCNC: 99 MMOL/L (ref 98–107)
CO2 SERPL-SCNC: 28 MMOL/L (ref 22–29)
CREAT SERPL-MCNC: 0.91 MG/DL (ref 0.76–1.27)
DEPRECATED RDW RBC AUTO: 42 FL (ref 37–54)
EGFRCR SERPLBLD CKD-EPI 2021: 99.5 ML/MIN/1.73
EOSINOPHIL # BLD AUTO: 0 10*3/MM3 (ref 0–0.4)
EOSINOPHIL NFR BLD AUTO: 0 % (ref 0.3–6.2)
ERYTHROCYTE [DISTWIDTH] IN BLOOD BY AUTOMATED COUNT: 12.8 % (ref 12.3–15.4)
GLOBULIN UR ELPH-MCNC: 2.5 GM/DL
GLUCOSE SERPL-MCNC: 113 MG/DL (ref 65–99)
HCT VFR BLD AUTO: 48.7 % (ref 37.5–51)
HGB BLD-MCNC: 16.9 G/DL (ref 13–17.7)
HOLD SPECIMEN: NORMAL
LYMPHOCYTES # BLD AUTO: 0.8 10*3/MM3 (ref 0.7–3.1)
LYMPHOCYTES NFR BLD AUTO: 10 % (ref 19.6–45.3)
MAGNESIUM SERPL-MCNC: 1.8 MG/DL (ref 1.6–2.6)
MCH RBC QN AUTO: 31.2 PG (ref 26.6–33)
MCHC RBC AUTO-ENTMCNC: 34.8 G/DL (ref 31.5–35.7)
MCV RBC AUTO: 89.7 FL (ref 79–97)
MONOCYTES # BLD AUTO: 0.6 10*3/MM3 (ref 0.1–0.9)
MONOCYTES NFR BLD AUTO: 7.4 % (ref 5–12)
NEUTROPHILS NFR BLD AUTO: 6.8 10*3/MM3 (ref 1.7–7)
NEUTROPHILS NFR BLD AUTO: 82.4 % (ref 42.7–76)
NRBC BLD AUTO-RTO: 0.3 /100 WBC (ref 0–0.2)
PLATELET # BLD AUTO: 183 10*3/MM3 (ref 140–450)
PMV BLD AUTO: 9.8 FL (ref 6–12)
POTASSIUM SERPL-SCNC: 4.4 MMOL/L (ref 3.5–5.2)
PROT SERPL-MCNC: 7.4 G/DL (ref 6–8.5)
RBC # BLD AUTO: 5.43 10*6/MM3 (ref 4.14–5.8)
SODIUM SERPL-SCNC: 139 MMOL/L (ref 136–145)
TROPONIN T SERPL-MCNC: <0.01 NG/ML (ref 0–0.03)
WBC NRBC COR # BLD: 8.3 10*3/MM3 (ref 3.4–10.8)
WHOLE BLOOD HOLD COAG: NORMAL

## 2022-11-04 PROCEDURE — 83735 ASSAY OF MAGNESIUM: CPT | Performed by: EMERGENCY MEDICINE

## 2022-11-04 PROCEDURE — 93005 ELECTROCARDIOGRAM TRACING: CPT | Performed by: EMERGENCY MEDICINE

## 2022-11-04 PROCEDURE — 85025 COMPLETE CBC W/AUTO DIFF WBC: CPT | Performed by: EMERGENCY MEDICINE

## 2022-11-04 PROCEDURE — 80053 COMPREHEN METABOLIC PANEL: CPT | Performed by: EMERGENCY MEDICINE

## 2022-11-04 PROCEDURE — 93005 ELECTROCARDIOGRAM TRACING: CPT

## 2022-11-04 PROCEDURE — 25010000002 ONDANSETRON PER 1 MG: Performed by: EMERGENCY MEDICINE

## 2022-11-04 PROCEDURE — 99284 EMERGENCY DEPT VISIT MOD MDM: CPT

## 2022-11-04 PROCEDURE — 99213 OFFICE O/P EST LOW 20 MIN: CPT | Performed by: FAMILY MEDICINE

## 2022-11-04 PROCEDURE — 99283 EMERGENCY DEPT VISIT LOW MDM: CPT

## 2022-11-04 PROCEDURE — 96374 THER/PROPH/DIAG INJ IV PUSH: CPT

## 2022-11-04 PROCEDURE — 84484 ASSAY OF TROPONIN QUANT: CPT | Performed by: EMERGENCY MEDICINE

## 2022-11-04 RX ORDER — ONDANSETRON 2 MG/ML
4 INJECTION INTRAMUSCULAR; INTRAVENOUS ONCE
Status: DISCONTINUED | OUTPATIENT
Start: 2022-11-04 | End: 2022-11-04

## 2022-11-04 RX ORDER — ONDANSETRON 2 MG/ML
8 INJECTION INTRAMUSCULAR; INTRAVENOUS ONCE
Status: COMPLETED | OUTPATIENT
Start: 2022-11-04 | End: 2022-11-04

## 2022-11-04 RX ORDER — MECLIZINE HYDROCHLORIDE 25 MG/1
50 TABLET ORAL ONCE
Status: COMPLETED | OUTPATIENT
Start: 2022-11-04 | End: 2022-11-04

## 2022-11-04 RX ORDER — CELECOXIB 200 MG/1
CAPSULE ORAL
COMMUNITY
Start: 2022-10-17

## 2022-11-04 RX ORDER — ONDANSETRON 8 MG/1
8 TABLET, ORALLY DISINTEGRATING ORAL EVERY 8 HOURS PRN
Qty: 12 TABLET | Refills: 0 | Status: SHIPPED | OUTPATIENT
Start: 2022-11-04 | End: 2023-02-08

## 2022-11-04 RX ORDER — MECLIZINE HYDROCHLORIDE 25 MG/1
25 TABLET ORAL 3 TIMES DAILY PRN
Qty: 15 TABLET | Refills: 0 | Status: SHIPPED | OUTPATIENT
Start: 2022-11-04 | End: 2023-02-08

## 2022-11-04 RX ADMIN — MECLIZINE HYDROCHLORIDE 25 MG: 25 TABLET ORAL at 15:19

## 2022-11-04 RX ADMIN — SODIUM CHLORIDE 1000 ML: 9 INJECTION, SOLUTION INTRAVENOUS at 15:16

## 2022-11-04 RX ADMIN — ONDANSETRON 8 MG: 2 INJECTION INTRAMUSCULAR; INTRAVENOUS at 15:07

## 2022-11-04 NOTE — TELEPHONE ENCOUNTER
Caller: Eugene Roman    Relationship: Self    Best call back number: [phone]    What was the call regarding: PATIENT STATED THAT HE HAS BEEN EXPERIENCING VERTIGO SINCE Tuesday. PATIENT STATED THAT IT SEEMED TO BE GETTING BETTER BUT IT GOT WORSE TODAY. PATIENT STATED THAT HE HAD VERTIGO IN THE PAST 5 YEARS AGO, BUT IT HAD RESOLVED. PATIENT WOULD LIKE SOME ADVICE ON WHAT HE SHOULD DO. PLEASE ADVISE.     Do you require a callback: YES

## 2022-11-04 NOTE — PROGRESS NOTES
"Subjective   Eugene Roman is a 56 y.o. male.     Chief Complaint   Patient presents with   • Dizziness       HPI  Chief complaint: Vertigo    The patient is a 56-year-old white male states that he has had vertigo for the past 3 days.  He states that on November 1 when he awakened he was fine but when he got up to take a shower he was out of balance.  Patient since then has developed has had severe whirling dizziness that comes and goes with minimal movement.  He states he has had nausea.  He denied ringing in his ears.  He denied deafness.  He denied problems processing information.  Patient's wife has done an Epley maneuver on him 2 times without help.  Patient uses a scopolamine patch without help.        The following portions of the patient's history were reviewed and updated as appropriate: allergies, current medications, past family history, past medical history, past social history, past surgical history and problem list.    Review of Systems    Objective     /81 (BP Location: Left arm, Patient Position: Sitting, Cuff Size: Adult)   Pulse 63   Temp 97 °F (36.1 °C) (Temporal)   Ht 172.7 cm (68\")   Wt 73 kg (161 lb)   SpO2 99%   BMI 24.48 kg/m²     Physical Exam  Vitals and nursing note reviewed.   Constitutional:       Appearance: He is well-developed.      Comments: Pale; appears not to feel well   HENT:      Head: Normocephalic and atraumatic.   Eyes:      Pupils: Pupils are equal, round, and reactive to light.   Cardiovascular:      Rate and Rhythm: Normal rate and regular rhythm.      Pulses: Normal pulses.      Heart sounds: Murmur heard.     No friction rub. No gallop.   Pulmonary:      Effort: Pulmonary effort is normal.      Breath sounds: Normal breath sounds.   Abdominal:      General: Bowel sounds are normal.      Palpations: Abdomen is soft.   Musculoskeletal:         General: Normal range of motion.      Cervical back: Neck supple.   Skin:     General: Skin is warm and dry. "   Neurological:      Mental Status: He is alert and oriented to person, place, and time.      Cranial Nerves: No cranial nerve deficit.      Sensory: No sensory deficit.      Motor: No weakness.      Coordination: Coordination normal.      Gait: Gait normal.      Deep Tendon Reflexes: Reflexes normal.   Psychiatric:         Behavior: Behavior normal.         Thought Content: Thought content normal.         Judgment: Judgment normal.           Assessment & Plan   Diagnoses and all orders for this visit:    1. Vertigo (Primary)-patient did not have orthostatic changes in his blood pressure.  Neurological exam did not reveal focal findings.  He did not have nystagmus.  He appears not to feel well.  Patient's clinical exam today fails reveal specific cause for his vertigo.  Most likely it is middle ear dysfunction likely viral.  Patient is pale and sweaty today.  He has not been able to eat today.  We discussed outpatient evaluation and outpatient management versus more urgent evaluation.  Patient opted for more urgent evaluation.  Patient was referred to the ER after discussion.      Patient Instructions   Go to the ER for urgent evaluation.      Black Mesa Jr., MD    11/04/22

## 2022-11-04 NOTE — DISCHARGE INSTRUCTIONS
Rest avoid exertion rapid head movements or driving for the next 3 days  Elevate head tonight  Medication as directed  Tylenol or ibuprofen for discomfort and any fever  Recheck Monday if not significantly better

## 2022-11-04 NOTE — ED PROVIDER NOTES
Subjective   History of Present Illness  56-year-old male complaining of vertigo the patient states that it started last evening.  The patient reports that he has had the Epley maneuvers done bilaterally without much improvement.  He states continued to have lightheaded he reports that he had no antecedent ataxia.  Dates his last episode was several years ago.  He he reports no recent fever chills denies neck pain or stiffness denies chest pain or palpitations.      States that he was having difficulty today with vomiting and could not keep Zofran tablets down long enough for them to dissolve      Review of Systems   Constitutional: Positive for diaphoresis. Negative for chills, fatigue and fever.   HENT: Negative for trouble swallowing.    Eyes: Negative for discharge.   Respiratory: Negative for chest tightness and shortness of breath.    Gastrointestinal: Positive for nausea. Negative for diarrhea and vomiting.   Neurological: Positive for dizziness and light-headedness. Negative for seizures, syncope, speech difficulty and numbness.       Past Medical History:   Diagnosis Date   • Allergic    • Hyperlipidemia    • Insomnia    • Valvular heart disease        Allergies   Allergen Reactions   • Ciprofloxacin Rash       Past Surgical History:   Procedure Laterality Date   • AORTIC VALVE REPAIR/REPLACEMENT     • HYDROCELECTOMY         Family History   Problem Relation Age of Onset   • No Known Problems Mother    • No Known Problems Father    • No Known Problems Sister    • No Known Problems Brother    • Birth defects Paternal Grandfather        Social History     Socioeconomic History   • Marital status:    Tobacco Use   • Smoking status: Never   • Smokeless tobacco: Never   Vaping Use   • Vaping Use: Never used   Substance and Sexual Activity   • Alcohol use: Not Currently   • Drug use: Not Currently   • Sexual activity: Defer     Reports no additional free water travel or activity      Objective   Physical  Exam  Alert Varina Coma Scale 15   HEENT: Pupils equal and reactive to light. Conjunctivae are not injected. normal tympanic membranes. Oropharynx and nares are normal.   Neck: Supple. Midline trachea. No JVD. No goiter.   Chest: Clear and equal breath sounds bilaterally regular rate and rhythm without murmur or rub.   Abdomen: Positive bowel sounds nontender nondistended. No rebound or peritoneal signs. No CVA tenderness.   Extremities/neuro cranial nerves intact motor sensor exam is normal  Reflexes elicited normal gait no clubbing cyanosis or edema motor sensory exam is normal the full range of motion is intact   skin: Warm and dry, no rashes or petechia.   Lymphatic: No regional lymphadenopathy. No calf pain, swelling or Natalia's sign    Procedures           ED Course                Labs Reviewed   COMPREHENSIVE METABOLIC PANEL - Abnormal; Notable for the following components:       Result Value    Glucose 113 (*)     Total Bilirubin 1.7 (*)     All other components within normal limits    Narrative:     GFR Normal >60  Chronic Kidney Disease <60  Kidney Failure <15     CBC WITH AUTO DIFFERENTIAL - Abnormal; Notable for the following components:    Neutrophil % 82.4 (*)     Lymphocyte % 10.0 (*)     Eosinophil % 0.0 (*)     nRBC 0.3 (*)     All other components within normal limits   TROPONIN (IN-HOUSE) - Normal    Narrative:     Troponin T Reference Range:  <= 0.03 ng/mL-   Negative for AMI  >0.03 ng/mL-     Abnormal for myocardial necrosis.  Clinicians would have to utilize clinical acumen, EKG, Troponin and serial changes to determine if it is an Acute Myocardial Infarction or myocardial injury due to an underlying chronic condition.       Results may be falsely decreased if patient taking Biotin.     MAGNESIUM - Normal   CBC AND DIFFERENTIAL    Narrative:     The following orders were created for panel order CBC & Differential.  Procedure                               Abnormality         Status                      ---------                               -----------         ------                     CBC Auto Differential[[id number]]        Abnormal            Final result                 Please view results for these tests on the individual orders.   EXTRA TUBES    Narrative:     The following orders were created for panel order Extra Tubes.  Procedure                               Abnormality         Status                     ---------                               -----------         ------                     Gold Top - SST[[id number]]                                   In process                 Light Blue Top[[id number]]                                   In process                   Please view results for these tests on the individual orders.   GOLD TOP - SST   LIGHT BLUE TOP     Medications   sodium chloride 0.9 % bolus 1,000 mL (1,000 mL Intravenous New Bag 11/4/22 1516)   meclizine (ANTIVERT) tablet 50 mg (25 mg Oral Given 11/4/22 1519)   ondansetron (ZOFRAN) injection 8 mg (8 mg Intravenous Given 11/4/22 1507)     No radiology results for the last day  CT scan not performed as patient wished conservative management initially.  He vocalized understanding of the limitations for diagnosis                           MDM  Number of Diagnoses or Management Options     Amount and/or Complexity of Data Reviewed  Clinical lab tests: reviewed  Tests in the medicine section of CPT®: reviewed    Risk of Complications, Morbidity, and/or Mortality  Presenting problems: high  Diagnostic procedures: high  Management options: high  General comments: Felt much better with ER management.  The patient was placed on meclizine and also orally dissolving Zofran tablets.  We discussed the potential use of steroids although the research is equivocal in this use.  The patient was stable at discharge vocalized understanding of discharge instruction warnings is encouraged to return or follow-up on Monday if not significantly  better        Final diagnoses:   Benign paroxysmal positional vertigo due to bilateral vestibular disorder       ED Disposition  ED Disposition     ED Disposition   Discharge    Condition   Stable    Comment   --             [place] Jr., MD  [facility] [address] [id number] [phone]          [facility]  [address]  [facility] [address]  [phone]  Call            Medication List      New Prescriptions    meclizine 25 MG tablet  Commonly known as: ANTIVERT  Take 1 tablet by mouth 3 (Three) Times a Day As Needed for Dizziness or Nausea.     ondansetron ODT 8 MG disintegrating tablet  Commonly known as: ZOFRAN-ODT  Place 1 tablet on the tongue Every 8 (Eight) Hours As Needed for Nausea or Vomiting.           Where to Get Your Medications      These medications were sent to [facility] - [place], [place] - [address] [facility] - [phone]  - [phone] [address], [facility] [address]    Phone: [phone]   · meclizine 25 MG tablet  · ondansetron ODT 8 MG disintegrating tablet          Paulo Wallace MD  11/04/22 4022

## 2022-11-06 LAB — QT INTERVAL: 427 MS

## 2022-12-29 RX ORDER — FLUTICASONE PROPIONATE 50 MCG
SPRAY, SUSPENSION (ML) NASAL
Qty: 48 ML | Refills: 0 | Status: SHIPPED | OUTPATIENT
Start: 2022-12-29

## 2022-12-29 RX ORDER — MONTELUKAST SODIUM 10 MG/1
TABLET ORAL
Qty: 90 TABLET | Refills: 0 | Status: SHIPPED | OUTPATIENT
Start: 2022-12-29 | End: 2023-02-27

## 2022-12-29 RX ORDER — TRAZODONE HYDROCHLORIDE 100 MG/1
TABLET ORAL
Qty: 90 TABLET | Refills: 0 | Status: SHIPPED | OUTPATIENT
Start: 2022-12-29

## 2023-01-09 DIAGNOSIS — F51.01 PRIMARY INSOMNIA: Chronic | ICD-10-CM

## 2023-01-10 RX ORDER — TADALAFIL 10 MG/1
TABLET ORAL
Qty: 30 TABLET | Refills: 2 | Status: SHIPPED | OUTPATIENT
Start: 2023-01-10

## 2023-01-10 RX ORDER — ZOLPIDEM TARTRATE 10 MG/1
TABLET ORAL
Qty: 90 TABLET | Refills: 0 | Status: SHIPPED | OUTPATIENT
Start: 2023-01-10 | End: 2023-04-06

## 2023-02-08 ENCOUNTER — OFFICE VISIT (OUTPATIENT)
Dept: FAMILY MEDICINE CLINIC | Facility: CLINIC | Age: 58
End: 2023-02-08
Payer: COMMERCIAL

## 2023-02-08 VITALS
RESPIRATION RATE: 16 BRPM | DIASTOLIC BLOOD PRESSURE: 81 MMHG | WEIGHT: 165 LBS | OXYGEN SATURATION: 99 % | HEIGHT: 68 IN | BODY MASS INDEX: 25.01 KG/M2 | SYSTOLIC BLOOD PRESSURE: 136 MMHG | TEMPERATURE: 98.5 F | HEART RATE: 68 BPM

## 2023-02-08 DIAGNOSIS — I38 VALVULAR HEART DISEASE: Chronic | ICD-10-CM

## 2023-02-08 DIAGNOSIS — M11.20 PSEUDOGOUT: ICD-10-CM

## 2023-02-08 DIAGNOSIS — F51.01 PRIMARY INSOMNIA: Chronic | ICD-10-CM

## 2023-02-08 DIAGNOSIS — E78.5 DYSLIPIDEMIA: Primary | Chronic | ICD-10-CM

## 2023-02-08 DIAGNOSIS — J30.9 ALLERGIC RHINITIS, UNSPECIFIED SEASONALITY, UNSPECIFIED TRIGGER: Chronic | ICD-10-CM

## 2023-02-08 DIAGNOSIS — Z13.9 ENCOUNTER FOR HEALTH-RELATED SCREENING: ICD-10-CM

## 2023-02-08 PROCEDURE — 99214 OFFICE O/P EST MOD 30 MIN: CPT | Performed by: FAMILY MEDICINE

## 2023-02-08 NOTE — PROGRESS NOTES
"Subjective   Eugene Roman is a 57 y.o. male.     Chief Complaint   Patient presents with   • Insomnia     6 mth f/u       HPI  Chief complaint: Hyperlipidemia valvular heart disease allergic rhinitis insomnia pseudogout    Patient is a 57-year-old white male comes in for follow-up and maintenance of his current problems which include    1.  Insomnia-stable-patient is currently on Ambien 10 mg at night and trazodone 100 mg nightly.  Patient states this helps him with sleep.  Patient has history of primary insomnia.    2.  Allergic rhinitis-stable-the patient on Flonase 2 sprays each nostril once a day and Claritin 10 mg daily.  Denies sinus congestion drainage or cough.      3. pseudogout-new-the patient's been diagnosed with pseudogout involving his right knee.  He states he is having difficulty with knee pain and swelling and a popliteal cyst with activity.  Patient was advised to do nonimpact type exercise.    4.  Hyperlipidemia-stable- the patient is on a diet.  He would like to have a follow-up lipid profile and a cardiac CT.  Patient is very active.    5.  Valvular heart disease/status post aortic valve replacement and pulmonic valve repair-stable-patient on aspirin 81 mg daily.  Patient denied chest pain shortness of breath orthopnea or PND.  Patient is very active physically.      The following portions of the patient's history were reviewed and updated as appropriate: allergies, current medications, past family history, past medical history, past social history, past surgical history and problem list.    Review of Systems    Objective     /81 (BP Location: Right arm, Patient Position: Sitting, Cuff Size: Adult)   Pulse 68   Temp 98.5 °F (36.9 °C) (Oral)   Resp 16   Ht 172.7 cm (68\")   Wt 74.8 kg (165 lb)   SpO2 99%   BMI 25.09 kg/m²     Physical Exam  Vitals and nursing note reviewed.   Constitutional:       Appearance: He is well-developed.   HENT:      Head: Normocephalic and atraumatic. "   Eyes:      Pupils: Pupils are equal, round, and reactive to light.   Cardiovascular:      Rate and Rhythm: Normal rate and regular rhythm.      Pulses: Normal pulses.      Heart sounds: Murmur heard.     No friction rub. No gallop.   Pulmonary:      Effort: Pulmonary effort is normal.      Breath sounds: Normal breath sounds.   Abdominal:      General: Bowel sounds are normal.      Palpations: Abdomen is soft.   Musculoskeletal:         General: Normal range of motion.      Cervical back: Neck supple.   Skin:     General: Skin is warm and dry.   Neurological:      Mental Status: He is alert and oriented to person, place, and time.   Psychiatric:         Behavior: Behavior normal.         Thought Content: Thought content normal.         Judgment: Judgment normal.           Assessment & Plan   Diagnoses and all orders for this visit:    1. Dyslipidemia (Primary)  -     Lipid Panel; Future    2. Valvular heart disease    3. Allergic rhinitis, unspecified seasonality, unspecified trigger    4. Primary insomnia    5. Pseudogout    6. Encounter for health-related screening  -     CT Cardiac Calcium Score Without Dye; Future      Patient Instructions   Continue your current medications and treatment.    Have the follow up labs done and call for results.    Get the cardiac CT.    Follow up in 6 months.      [place] Jr., MD    02/08/23

## 2023-02-08 NOTE — PATIENT INSTRUCTIONS
Continue your current medications and treatment.    Have the follow up labs done and call for results.    Get the cardiac CT.    Follow up in 6 months.

## 2023-02-17 ENCOUNTER — HOSPITAL ENCOUNTER (OUTPATIENT)
Dept: CT IMAGING | Facility: HOSPITAL | Age: 58
Discharge: HOME | End: 2023-02-17
Payer: COMMERCIAL

## 2023-02-17 ENCOUNTER — LAB (OUTPATIENT)
Dept: LAB | Facility: HOSPITAL | Age: 58
End: 2023-02-17
Payer: COMMERCIAL

## 2023-02-17 DIAGNOSIS — Z13.9 ENCOUNTER FOR HEALTH-RELATED SCREENING: ICD-10-CM

## 2023-02-17 DIAGNOSIS — E78.5 DYSLIPIDEMIA: Chronic | ICD-10-CM

## 2023-02-17 LAB
CHOLEST SERPL-MCNC: 182 MG/DL (ref 0–200)
HDLC SERPL-MCNC: 62 MG/DL (ref 40–60)
LDLC SERPL CALC-MCNC: 106 MG/DL (ref 0–100)
LDLC/HDLC SERPL: 1.68 {RATIO}
TRIGL SERPL-MCNC: 78 MG/DL (ref 0–150)
VLDLC SERPL-MCNC: 14 MG/DL (ref 5–40)

## 2023-02-17 PROCEDURE — 36415 COLL VENOUS BLD VENIPUNCTURE: CPT

## 2023-02-17 PROCEDURE — 80061 LIPID PANEL: CPT

## 2023-02-17 PROCEDURE — 75571 CT HRT W/O DYE W/CA TEST: CPT

## 2023-02-21 DIAGNOSIS — E78.5 DYSLIPIDEMIA: Primary | Chronic | ICD-10-CM

## 2023-02-21 DIAGNOSIS — I38 VALVULAR HEART DISEASE: Chronic | ICD-10-CM

## 2023-02-21 DIAGNOSIS — I25.10 CORONARY ARTERY DISEASE INVOLVING NATIVE CORONARY ARTERY OF NATIVE HEART WITHOUT ANGINA PECTORIS: ICD-10-CM

## 2023-02-21 RX ORDER — ROSUVASTATIN CALCIUM 10 MG/1
10 TABLET, COATED ORAL DAILY
Qty: 90 TABLET | Refills: 1 | Status: SHIPPED | OUTPATIENT
Start: 2023-02-21

## 2023-02-27 RX ORDER — MONTELUKAST SODIUM 10 MG/1
TABLET ORAL
Qty: 90 TABLET | Refills: 0 | Status: SHIPPED | OUTPATIENT
Start: 2023-02-27

## 2023-04-05 DIAGNOSIS — F51.01 PRIMARY INSOMNIA: Chronic | ICD-10-CM

## 2023-04-06 RX ORDER — ZOLPIDEM TARTRATE 10 MG/1
TABLET ORAL
Qty: 90 TABLET | Refills: 0 | Status: SHIPPED | OUTPATIENT
Start: 2023-04-06

## 2023-05-11 RX ORDER — MONTELUKAST SODIUM 10 MG/1
TABLET ORAL
Qty: 90 TABLET | Refills: 0 | Status: SHIPPED | OUTPATIENT
Start: 2023-05-11

## 2024-11-25 NOTE — PATIENT INSTRUCTIONS
Continue your current healthy lifestyle.    Have the follow up labs done and call for results.    Continue current medications.    Follow up in the office in 6 months.  
Unknown